# Patient Record
Sex: FEMALE | Race: WHITE | URBAN - METROPOLITAN AREA
[De-identification: names, ages, dates, MRNs, and addresses within clinical notes are randomized per-mention and may not be internally consistent; named-entity substitution may affect disease eponyms.]

---

## 2017-04-07 ENCOUNTER — OFFICE (OUTPATIENT)
Dept: URBAN - METROPOLITAN AREA CLINIC 101 | Facility: CLINIC | Age: 57
End: 2017-04-07

## 2017-04-07 VITALS
DIASTOLIC BLOOD PRESSURE: 72 MMHG | WEIGHT: 210 LBS | HEIGHT: 64 IN | HEART RATE: 84 BPM | SYSTOLIC BLOOD PRESSURE: 129 MMHG | TEMPERATURE: 98.1 F

## 2017-04-07 DIAGNOSIS — R10.11 RIGHT UPPER QUADRANT PAIN: ICD-10-CM

## 2017-04-07 DIAGNOSIS — E66.9 OBESITY, UNSPECIFIED: ICD-10-CM

## 2017-04-07 DIAGNOSIS — R94.5 ABNORMAL RESULTS OF LIVER FUNCTION STUDIES: ICD-10-CM

## 2017-04-07 DIAGNOSIS — K73.9 CHRONIC HEPATITIS, UNSPECIFIED: ICD-10-CM

## 2017-04-07 PROCEDURE — 99214 OFFICE O/P EST MOD 30 MIN: CPT

## 2018-05-08 ENCOUNTER — OFFICE (OUTPATIENT)
Dept: URBAN - METROPOLITAN AREA CLINIC 101 | Facility: CLINIC | Age: 58
End: 2018-05-08
Payer: COMMERCIAL

## 2018-05-08 VITALS
HEIGHT: 64 IN | SYSTOLIC BLOOD PRESSURE: 110 MMHG | TEMPERATURE: 98.1 F | HEART RATE: 95 BPM | DIASTOLIC BLOOD PRESSURE: 79 MMHG | WEIGHT: 209 LBS

## 2018-05-08 DIAGNOSIS — K73.9 CHRONIC HEPATITIS, UNSPECIFIED: ICD-10-CM

## 2018-05-08 DIAGNOSIS — Z80.0 FAMILY HISTORY OF MALIGNANT NEOPLASM OF DIGESTIVE ORGANS: ICD-10-CM

## 2018-05-08 DIAGNOSIS — E66.9 OBESITY, UNSPECIFIED: ICD-10-CM

## 2018-05-08 DIAGNOSIS — R94.5 ABNORMAL RESULTS OF LIVER FUNCTION STUDIES: ICD-10-CM

## 2018-05-08 PROCEDURE — 99214 OFFICE O/P EST MOD 30 MIN: CPT

## 2018-11-13 ENCOUNTER — OFFICE (OUTPATIENT)
Dept: URBAN - METROPOLITAN AREA CLINIC 101 | Facility: CLINIC | Age: 58
End: 2018-11-13
Payer: COMMERCIAL

## 2018-11-13 VITALS
TEMPERATURE: 98.1 F | HEART RATE: 86 BPM | WEIGHT: 212 LBS | DIASTOLIC BLOOD PRESSURE: 77 MMHG | SYSTOLIC BLOOD PRESSURE: 119 MMHG | HEIGHT: 64 IN

## 2018-11-13 DIAGNOSIS — D69.6 THROMBOCYTOPENIA, UNSPECIFIED: ICD-10-CM

## 2018-11-13 DIAGNOSIS — K74.60 UNSPECIFIED CIRRHOSIS OF LIVER: ICD-10-CM

## 2018-11-13 DIAGNOSIS — K59.09 OTHER CONSTIPATION: ICD-10-CM

## 2018-11-13 DIAGNOSIS — E66.9 OBESITY, UNSPECIFIED: ICD-10-CM

## 2018-11-13 PROCEDURE — 99214 OFFICE O/P EST MOD 30 MIN: CPT

## 2019-01-28 ENCOUNTER — OFFICE (OUTPATIENT)
Dept: URBAN - METROPOLITAN AREA CLINIC 101 | Facility: CLINIC | Age: 59
End: 2019-01-28
Payer: COMMERCIAL

## 2019-01-28 VITALS
TEMPERATURE: 98.4 F | WEIGHT: 211 LBS | HEART RATE: 97 BPM | DIASTOLIC BLOOD PRESSURE: 73 MMHG | SYSTOLIC BLOOD PRESSURE: 122 MMHG | HEIGHT: 64 IN

## 2019-01-28 DIAGNOSIS — R10.31 RIGHT LOWER QUADRANT PAIN: ICD-10-CM

## 2019-01-28 DIAGNOSIS — K73.9 CHRONIC HEPATITIS, UNSPECIFIED: ICD-10-CM

## 2019-01-28 DIAGNOSIS — E66.9 OBESITY, UNSPECIFIED: ICD-10-CM

## 2019-01-28 PROCEDURE — 99214 OFFICE O/P EST MOD 30 MIN: CPT

## 2019-01-28 RX ORDER — DICYCLOMINE HYDROCHLORIDE 20 MG/1
TABLET ORAL
Qty: 60 | Refills: 3 | Status: COMPLETED
End: 2020-10-05

## 2019-03-18 ENCOUNTER — ON CAMPUS - OUTPATIENT (OUTPATIENT)
Dept: URBAN - METROPOLITAN AREA HOSPITAL 35 | Facility: HOSPITAL | Age: 59
End: 2019-03-18
Payer: COMMERCIAL

## 2019-03-18 DIAGNOSIS — R93.5 ABNORMAL FINDINGS ON DIAGNOSTIC IMAGING OF OTHER ABDOMINAL R: ICD-10-CM

## 2019-03-18 DIAGNOSIS — D12.5 BENIGN NEOPLASM OF SIGMOID COLON: ICD-10-CM

## 2019-03-18 DIAGNOSIS — K63.5 POLYP OF COLON: ICD-10-CM

## 2019-03-18 DIAGNOSIS — R10.31 RIGHT LOWER QUADRANT PAIN: ICD-10-CM

## 2019-03-18 PROCEDURE — 45385 COLONOSCOPY W/LESION REMOVAL: CPT

## 2019-04-25 ENCOUNTER — OFFICE (OUTPATIENT)
Dept: URBAN - METROPOLITAN AREA CLINIC 33 | Facility: CLINIC | Age: 59
End: 2019-04-25

## 2019-04-25 VITALS
TEMPERATURE: 97.9 F | WEIGHT: 203 LBS | HEIGHT: 64 IN | HEART RATE: 80 BPM | DIASTOLIC BLOOD PRESSURE: 67 MMHG | SYSTOLIC BLOOD PRESSURE: 110 MMHG

## 2019-04-25 DIAGNOSIS — R16.2 HEPATOMEGALY WITH SPLENOMEGALY, NOT ELSEWHERE CLASSIFIED: ICD-10-CM

## 2019-04-25 DIAGNOSIS — R10.31 RIGHT LOWER QUADRANT PAIN: ICD-10-CM

## 2019-04-25 DIAGNOSIS — E66.9 OBESITY, UNSPECIFIED: ICD-10-CM

## 2019-04-25 PROCEDURE — 99214 OFFICE O/P EST MOD 30 MIN: CPT

## 2020-10-05 ENCOUNTER — OFFICE (OUTPATIENT)
Dept: URBAN - METROPOLITAN AREA TELEHEALTH 3 | Facility: TELEHEALTH | Age: 60
End: 2020-10-05
Payer: COMMERCIAL

## 2020-10-05 VITALS — HEIGHT: 64 IN | WEIGHT: 188 LBS

## 2020-10-05 DIAGNOSIS — K74.60 UNSPECIFIED CIRRHOSIS OF LIVER: ICD-10-CM

## 2020-10-05 DIAGNOSIS — K59.01 SLOW TRANSIT CONSTIPATION: ICD-10-CM

## 2020-10-05 DIAGNOSIS — R94.5 ABNORMAL RESULTS OF LIVER FUNCTION STUDIES: ICD-10-CM

## 2020-10-05 DIAGNOSIS — K76.6 PORTAL HYPERTENSION: ICD-10-CM

## 2020-10-05 PROCEDURE — 99214 OFFICE O/P EST MOD 30 MIN: CPT | Mod: 95 | Performed by: INTERNAL MEDICINE

## 2020-10-05 RX ORDER — PLECANATIDE 3 MG/1
TABLET ORAL
Qty: 30 | Refills: 2 | Status: COMPLETED
Start: 2020-10-05 | End: 2020-11-06

## 2020-11-06 ENCOUNTER — OFFICE (OUTPATIENT)
Dept: URBAN - METROPOLITAN AREA TELEHEALTH 3 | Facility: TELEHEALTH | Age: 60
End: 2020-11-06
Payer: COMMERCIAL

## 2020-11-06 VITALS — WEIGHT: 190 LBS | HEIGHT: 64 IN

## 2020-11-06 DIAGNOSIS — R10.31 RIGHT LOWER QUADRANT PAIN: ICD-10-CM

## 2020-11-06 DIAGNOSIS — K59.09 OTHER CONSTIPATION: ICD-10-CM

## 2020-11-06 DIAGNOSIS — K73.9 CHRONIC HEPATITIS, UNSPECIFIED: ICD-10-CM

## 2020-11-06 PROCEDURE — 99215 OFFICE O/P EST HI 40 MIN: CPT | Mod: 95 | Performed by: INTERNAL MEDICINE

## 2020-11-06 NOTE — SERVICEHPINOTES
PATIENT VERIFIED BY DATE OF BIRTH AND NAME. Patient has been consented for this telecommunication visit. Ms. Romo is here for follow up. She has a hx of obesity, ARCHULETA, DM II, here for follow up. Since last visit, she underwent workup for low platelets by Hematology, and this led to a negative bone marrow biopsy. This has been attributed to hepatosplenomegaly and ARCHULETA cirrhosis. She was sent to Dr. Madrid [facility] for w/u, and this was unremarkable. She was also sent to [facility] for 2nd GI opinion and they felt this was MSK in etiology, so offered low dose TCA, abdominal girdle, and lidocaine patch, which she declined.   With her hx of numerous surgeries throughout her life, there's been a suspicion of adhesions playing a role.U/S imaging revealed a right ovarian cyst, but Gyn did not thing this was the cause, per her report. CT imaging was performed, which did not reveal any obstructive pathology, no biliary ductal dilatation, but mild thickening of the ascending colon. This led to colonoscopy which did not reveal any pathology in at ascending colon, and benign polyps removed. No cancer seen.She continues to have sharp constant, focal RLQ abdominal pain, daily.  This can sometimes be worse after lifting, but also can be relieved with defecation.  She has "unbearable" constipation, and has been tried on Miralax, Colace, Senna, Amitiza, Linzess, and recently Trulance with no clinical response.  Bentyl Rx prn can help the pain on occasion.  This has been causing her frustration, and she is requesting to see surgery to evaluate for adhesions.All 10 point review of systems have been reviewed as per HPI and otherwise negative. BR

## 2024-01-23 ENCOUNTER — TELEHEALTH PROVIDED OTHER THAN IN PATIENT'S HOME (OUTPATIENT)
Dept: URBAN - METROPOLITAN AREA TELEHEALTH 12 | Facility: TELEHEALTH | Age: 64
End: 2024-01-23
Payer: COMMERCIAL

## 2024-01-23 VITALS — HEIGHT: 64 IN | WEIGHT: 188 LBS

## 2024-01-23 DIAGNOSIS — E66.9 OBESITY, UNSPECIFIED: ICD-10-CM

## 2024-01-23 DIAGNOSIS — D69.6 THROMBOCYTOPENIA, UNSPECIFIED: ICD-10-CM

## 2024-01-23 DIAGNOSIS — K74.60 UNSPECIFIED CIRRHOSIS OF LIVER: ICD-10-CM

## 2024-01-23 DIAGNOSIS — K59.09 OTHER CONSTIPATION: ICD-10-CM

## 2024-01-23 PROCEDURE — 99204 OFFICE O/P NEW MOD 45 MIN: CPT | Mod: 95 | Performed by: PHYSICIAN ASSISTANT

## 2024-01-23 NOTE — SERVICENOTES
Patient's visit was conducted through [employer] video telecommunication. Patient consented before the start of visit as to understanding of privacy concerns, possible technological failure, and their responsibility of carrying out instructions of plan.

## 2024-01-23 NOTE — SERVICEHPINOTES
PATIENT VERIFIED BY DATE OF BIRTH AND NAME. Patient has been consented for this telecommunication visit.   Pt is here to discuss constipation/hemorrhoids. She is due for a colonoscopy--last one in 2019 showed a TA--given a 5 yr recall. Pt has ARCHULETA cirrhosis and is followed by [facility] for this.    Cirrhosis is stable per patient. She denies abdominal pain, wt loss, diarrhea, or chest pain. Pt has chronic constipation and this is controlled on Linzess 290 mcg. Pt has external hemorrhoids and uses prescription ointment on these--this helps at times. No heart problems per patient. No cardiologist per pt. No chest pain or SOB. No problems with CKD. No other GI related complaints today.

## 2024-01-29 PROBLEM — Z86.010 SURVEILLANCE DUE TO PRIOR COLONIC NEOPLASIA: Status: ACTIVE | Noted: 2024-01-29

## 2024-04-02 LAB
CBC/DIFF AMBIGUOUS DEFAULT: BASO (ABSOLUTE): 0 X10E3/UL (ref 0–0.2)
CBC/DIFF AMBIGUOUS DEFAULT: BASOS: 1 %
CBC/DIFF AMBIGUOUS DEFAULT: EOS (ABSOLUTE): 0.1 X10E3/UL (ref 0–0.4)
CBC/DIFF AMBIGUOUS DEFAULT: EOS: 2 %
CBC/DIFF AMBIGUOUS DEFAULT: HEMATOCRIT: 37.3 % (ref 34–46.6)
CBC/DIFF AMBIGUOUS DEFAULT: HEMATOLOGY COMMENTS: (no result)
CBC/DIFF AMBIGUOUS DEFAULT: HEMOGLOBIN: 12.3 G/DL (ref 11.1–15.9)
CBC/DIFF AMBIGUOUS DEFAULT: IMMATURE CELLS: (no result)
CBC/DIFF AMBIGUOUS DEFAULT: IMMATURE GRANS (ABS): 0 X10E3/UL (ref 0–0.1)
CBC/DIFF AMBIGUOUS DEFAULT: IMMATURE GRANULOCYTES: 0 %
CBC/DIFF AMBIGUOUS DEFAULT: LYMPHS (ABSOLUTE): 0.7 X10E3/UL (ref 0.7–3.1)
CBC/DIFF AMBIGUOUS DEFAULT: LYMPHS: 24 %
CBC/DIFF AMBIGUOUS DEFAULT: MCH: 28.4 PG (ref 26.6–33)
CBC/DIFF AMBIGUOUS DEFAULT: MCHC: 33 G/DL (ref 31.5–35.7)
CBC/DIFF AMBIGUOUS DEFAULT: MCV: 86 FL (ref 79–97)
CBC/DIFF AMBIGUOUS DEFAULT: MONOCYTES(ABSOLUTE): 0.2 X10E3/UL (ref 0.1–0.9)
CBC/DIFF AMBIGUOUS DEFAULT: MONOCYTES: 8 %
CBC/DIFF AMBIGUOUS DEFAULT: NEUTROPHILS (ABSOLUTE): 2 X10E3/UL (ref 1.4–7)
CBC/DIFF AMBIGUOUS DEFAULT: NEUTROPHILS: 65 %
CBC/DIFF AMBIGUOUS DEFAULT: NRBC: (no result)
CBC/DIFF AMBIGUOUS DEFAULT: PLATELETS: 54 X10E3/UL — CRITICAL LOW (ref 150–450)
CBC/DIFF AMBIGUOUS DEFAULT: RBC: 4.33 X10E6/UL (ref 3.77–5.28)
CBC/DIFF AMBIGUOUS DEFAULT: RDW: 13.8 % (ref 11.7–15.4)
CBC/DIFF AMBIGUOUS DEFAULT: WBC: 3.1 X10E3/UL — LOW (ref 3.4–10.8)
PROTHROMBIN TIME (PT): INR: 1.2 (ref 0.9–1.2)
PROTHROMBIN TIME (PT): PROTHROMBIN TIME: 12.4 SEC — HIGH (ref 9.1–12)

## 2024-04-11 ENCOUNTER — ON CAMPUS - OUTPATIENT (OUTPATIENT)
Dept: URBAN - METROPOLITAN AREA HOSPITAL 16 | Facility: HOSPITAL | Age: 64
End: 2024-04-11
Payer: COMMERCIAL

## 2024-04-11 DIAGNOSIS — K63.5 POLYP OF COLON: ICD-10-CM

## 2024-04-11 DIAGNOSIS — Z86.010 PERSONAL HISTORY OF COLONIC POLYPS: ICD-10-CM

## 2024-04-11 DIAGNOSIS — Z53.8 PROCEDURE AND TREATMENT NOT CARRIED OUT FOR OTHER REASONS: ICD-10-CM

## 2024-04-11 DIAGNOSIS — Z09 ENCOUNTER FOR FOLLOW-UP EXAMINATION AFTER COMPLETED TREATMEN: ICD-10-CM

## 2024-04-11 PROCEDURE — 45380 COLONOSCOPY AND BIOPSY: CPT | Mod: 53,PT | Performed by: INTERNAL MEDICINE

## 2024-04-11 PROCEDURE — 45380 COLONOSCOPY AND BIOPSY: CPT | Mod: PT,53 | Performed by: INTERNAL MEDICINE
